# Patient Record
Sex: FEMALE | Race: BLACK OR AFRICAN AMERICAN | Employment: FULL TIME | URBAN - METROPOLITAN AREA
[De-identification: names, ages, dates, MRNs, and addresses within clinical notes are randomized per-mention and may not be internally consistent; named-entity substitution may affect disease eponyms.]

---

## 2019-10-18 ENCOUNTER — HOSPITAL ENCOUNTER (EMERGENCY)
Age: 39
Discharge: HOME | End: 2019-10-18
Attending: EMERGENCY MEDICINE
Payer: COMMERCIAL

## 2019-10-18 ENCOUNTER — APPOINTMENT (OUTPATIENT)
Dept: ULTRASOUND IMAGING | Age: 39
End: 2019-10-18
Attending: EMERGENCY MEDICINE
Payer: COMMERCIAL

## 2019-10-18 VITALS
OXYGEN SATURATION: 99 % | DIASTOLIC BLOOD PRESSURE: 80 MMHG | RESPIRATION RATE: 14 BRPM | HEART RATE: 66 BPM | BODY MASS INDEX: 33.15 KG/M2 | SYSTOLIC BLOOD PRESSURE: 112 MMHG | WEIGHT: 181.22 LBS | TEMPERATURE: 98.5 F

## 2019-10-18 DIAGNOSIS — D21.9 FIBROIDS: Primary | ICD-10-CM

## 2019-10-18 DIAGNOSIS — Z78.9 FAILURE OF OUTPATIENT TREATMENT: ICD-10-CM

## 2019-10-18 DIAGNOSIS — N30.00 ACUTE CYSTITIS WITHOUT HEMATURIA: ICD-10-CM

## 2019-10-18 PROCEDURE — 74011250637 HC RX REV CODE- 250/637: Performed by: EMERGENCY MEDICINE

## 2019-10-18 PROCEDURE — 76830 TRANSVAGINAL US NON-OB: CPT

## 2019-10-18 PROCEDURE — 99283 EMERGENCY DEPT VISIT LOW MDM: CPT

## 2019-10-18 PROCEDURE — 76856 US EXAM PELVIC COMPLETE: CPT

## 2019-10-18 RX ORDER — CEPHALEXIN 500 MG/1
500 CAPSULE ORAL 3 TIMES DAILY
Qty: 21 CAP | Refills: 0 | Status: SHIPPED | OUTPATIENT
Start: 2019-10-18 | End: 2019-10-25

## 2019-10-18 RX ORDER — IBUPROFEN 800 MG/1
800 TABLET ORAL
Qty: 20 TAB | Refills: 0 | Status: SHIPPED | OUTPATIENT
Start: 2019-10-18 | End: 2019-10-25

## 2019-10-18 RX ORDER — PHYTONADIONE 5 MG/1
TABLET ORAL
COMMUNITY

## 2019-10-18 RX ORDER — ACETAMINOPHEN 500 MG
1000 TABLET ORAL
Status: COMPLETED | OUTPATIENT
Start: 2019-10-18 | End: 2019-10-18

## 2019-10-18 RX ORDER — ACETAMINOPHEN 500 MG
1000 TABLET ORAL
Qty: 20 TAB | Refills: 0 | Status: SHIPPED | OUTPATIENT
Start: 2019-10-18

## 2019-10-18 RX ADMIN — ACETAMINOPHEN 1000 MG: 500 TABLET ORAL at 13:41

## 2019-10-18 NOTE — LETTER
[address] 
[facility] 
[address] [id number] 
[phone] Work/School Note Date: 10/18/2019 To Whom It May concern: 
 
Elsa Dugan was seen and treated today in the emergency room by the following provider(s): 
Attending Provider: Fercho Dumont MD.   
 
 
 
Sincerely, 
 
 
 
 
Marcus Eagle RN

## 2019-10-18 NOTE — ED TRIAGE NOTES
Triage note: Pt states she began having lower back and abdominal pain for about a week that has progressively worsened. Was having urinary sx and began antibiotics on 10/10. States her urinary sx got better but are now back with a feeling of \"pressure\". Was seen at [facility] and ua was clear.

## 2019-10-18 NOTE — ED NOTES
The patient was discharged home by Dr Michael Calzada in stable condition. The patient is alert and oriented, in no respiratory distress and discharge vital signs obtained. The patient's diagnosis, condition and treatment were explained. The patient expressed understanding. 3 prescriptions given. work note given. A discharge plan has been developed. Aftercare instructions were given. Pt ambulatory out of the ED.

## 2019-10-18 NOTE — ED PROVIDER NOTES
The history is provided by the patient. Abdominal Pain    This is a recurrent problem. The current episode started 2 days ago. The problem occurs constantly. The problem has not changed since onset. Associated with: stopping antibiotics for UTI. The pain is located in the suprapubic region. The quality of the pain is aching and dull. The pain is mild. Associated symptoms include back pain. Pertinent negatives include no anorexia, no fever, no diarrhea, no hematochezia, no nausea, no vomiting, no dysuria, no frequency and no hematuria. Relieved by: previous antibiotic therapy. Her past medical history is significant for UTI. Back Pain    This is a recurrent problem. Episode onset: 3 days ago. The problem has not changed since onset. Associated symptoms include abdominal pain. Pertinent negatives include no fever and no dysuria. History reviewed. No pertinent past medical history. History reviewed. No pertinent surgical history. History reviewed. No pertinent family history. Social History     Socioeconomic History    Marital status: SINGLE     Spouse name: Not on file    Number of children: Not on file    Years of education: Not on file    Highest education level: Not on file   Occupational History    Not on file   Social Needs    Financial resource strain: Not on file    Food insecurity:     Worry: Not on file     Inability: Not on file    Transportation needs:     Medical: Not on file     Non-medical: Not on file   Tobacco Use    Smoking status: Current Every Day Smoker   Substance and Sexual Activity    Alcohol use:  Yes    Drug use: No    Sexual activity: Yes     Partners: Male     Birth control/protection: None   Lifestyle    Physical activity:     Days per week: Not on file     Minutes per session: Not on file    Stress: Not on file   Relationships    Social connections:     Talks on phone: Not on file     Gets together: Not on file     Attends [employer] service: Not on file Active member of club or organization: Not on file     Attends meetings of clubs or organizations: Not on file     Relationship status: Not on file    Intimate partner violence:     Fear of current or ex partner: Not on file     Emotionally abused: Not on file     Physically abused: Not on file     Forced sexual activity: Not on file   Other Topics Concern    Not on file   Social History Narrative    Not on file         ALLERGIES: Patient has no known allergies. Review of Systems   Constitutional: Negative for fever. Gastrointestinal: Positive for abdominal pain. Negative for anorexia, diarrhea, hematochezia, nausea and vomiting. Genitourinary: Negative for dysuria, frequency and hematuria. Musculoskeletal: Positive for back pain. All other systems reviewed and are negative. Vitals:    10/18/19 1150   BP: 125/75   Pulse: 63   Resp: 16   Temp: 98.9 °F (37.2 °C)   SpO2: 99%   Weight: 82.2 kg (181 lb 3.5 oz)            Physical Exam   Constitutional: She appears well-developed and well-nourished. No distress. HENT:   Head: Normocephalic and atraumatic. Eyes: Conjunctivae are normal.   Neck: Neck supple. Cardiovascular: Normal rate and regular rhythm. Pulmonary/Chest: Effort normal. No respiratory distress. Abdominal: She exhibits no distension. There is tenderness in the suprapubic area. There is no rigidity, no rebound, no guarding and no CVA tenderness. Musculoskeletal: Normal range of motion. She exhibits no deformity. Neurological: She is alert. No cranial nerve deficit. Skin: Skin is warm and dry. Psychiatric: Her behavior is normal.   Nursing note and vitals reviewed. MDM     41-year-old female presents with recurrent pelvic discomfort after stopping 3 days worth of antibiotic therapy on Suprax for empiric treatment of urinary tract infection. She got better during treatment and after she stopped she soon had recurrence of symptoms.   It appears she may have had an incomplete treatment due to short course, she had lab testing which was unremarkable and urinalysis which was unremarkable earlier today. No signs of torsion or other complicating feature on pelvic ultrasound. Unlikely that bacteria would show up on UA at this point if it was partially treated so we will extend her treatment another 10 days with Keflex therapy. Return precautions discussed for worsening or new concerning symptoms.     Procedures